# Patient Record
Sex: FEMALE | Race: WHITE | Employment: UNEMPLOYED | URBAN - METROPOLITAN AREA
[De-identification: names, ages, dates, MRNs, and addresses within clinical notes are randomized per-mention and may not be internally consistent; named-entity substitution may affect disease eponyms.]

---

## 2019-09-21 ENCOUNTER — HOSPITAL ENCOUNTER (EMERGENCY)
Facility: CLINIC | Age: 29
Discharge: HOME | End: 2019-09-21
Attending: EMERGENCY MEDICINE | Admitting: EMERGENCY MEDICINE
Payer: COMMERCIAL

## 2019-09-21 VITALS
HEART RATE: 92 BPM | WEIGHT: 173 LBS | DIASTOLIC BLOOD PRESSURE: 77 MMHG | BODY MASS INDEX: 27.15 KG/M2 | OXYGEN SATURATION: 92 % | HEIGHT: 67 IN | TEMPERATURE: 97.5 F | SYSTOLIC BLOOD PRESSURE: 125 MMHG | RESPIRATION RATE: 16 BRPM

## 2019-09-21 DIAGNOSIS — N73.0 PID (ACUTE PELVIC INFLAMMATORY DISEASE): ICD-10-CM

## 2019-09-21 DIAGNOSIS — N39.0 URINARY TRACT INFECTION WITHOUT HEMATURIA, SITE UNSPECIFIED: ICD-10-CM

## 2019-09-21 LAB
ALBUMIN UR-MCNC: 10 MG/DL
APPEARANCE UR: ABNORMAL
BACTERIA #/AREA URNS HPF: ABNORMAL /HPF
BILIRUB UR QL STRIP: NEGATIVE
COLOR UR AUTO: YELLOW
GLUCOSE UR STRIP-MCNC: NEGATIVE MG/DL
HCG UR QL: NEGATIVE
HGB UR QL STRIP: ABNORMAL
KETONES UR STRIP-MCNC: NEGATIVE MG/DL
LEUKOCYTE ESTERASE UR QL STRIP: ABNORMAL
NITRATE UR QL: POSITIVE
PH UR STRIP: 5 PH (ref 5–7)
RBC #/AREA URNS AUTO: 0 /HPF (ref 0–2)
SOURCE: ABNORMAL
SP GR UR STRIP: 1.01 (ref 1–1.03)
SPECIMEN SOURCE: ABNORMAL
SQUAMOUS #/AREA URNS AUTO: 2 /HPF (ref 0–1)
UROBILINOGEN UR STRIP-MCNC: NORMAL MG/DL (ref 0–2)
WBC #/AREA URNS AUTO: >182 /HPF (ref 0–5)
WBC CLUMPS #/AREA URNS HPF: PRESENT /HPF
WET PREP SPEC: ABNORMAL

## 2019-09-21 PROCEDURE — 87491 CHLMYD TRACH DNA AMP PROBE: CPT | Performed by: EMERGENCY MEDICINE

## 2019-09-21 PROCEDURE — 96372 THER/PROPH/DIAG INJ SC/IM: CPT

## 2019-09-21 PROCEDURE — 99284 EMERGENCY DEPT VISIT MOD MDM: CPT | Mod: 25

## 2019-09-21 PROCEDURE — 25000132 ZZH RX MED GY IP 250 OP 250 PS 637: Performed by: EMERGENCY MEDICINE

## 2019-09-21 PROCEDURE — 87210 SMEAR WET MOUNT SALINE/INK: CPT | Performed by: EMERGENCY MEDICINE

## 2019-09-21 PROCEDURE — 25000125 ZZHC RX 250: Performed by: EMERGENCY MEDICINE

## 2019-09-21 PROCEDURE — 81001 URINALYSIS AUTO W/SCOPE: CPT | Performed by: EMERGENCY MEDICINE

## 2019-09-21 PROCEDURE — 81025 URINE PREGNANCY TEST: CPT | Performed by: EMERGENCY MEDICINE

## 2019-09-21 PROCEDURE — 25000128 H RX IP 250 OP 636: Performed by: EMERGENCY MEDICINE

## 2019-09-21 PROCEDURE — 87591 N.GONORRHOEAE DNA AMP PROB: CPT | Performed by: EMERGENCY MEDICINE

## 2019-09-21 RX ORDER — AZITHROMYCIN 250 MG/1
1000 TABLET, FILM COATED ORAL ONCE
Status: DISCONTINUED | OUTPATIENT
Start: 2019-09-21 | End: 2019-09-21

## 2019-09-21 RX ORDER — CEPHALEXIN 500 MG/1
500 CAPSULE ORAL 4 TIMES DAILY
Qty: 40 CAPSULE | Refills: 0 | Status: SHIPPED | OUTPATIENT
Start: 2019-09-21 | End: 2019-10-01

## 2019-09-21 RX ORDER — FLUCONAZOLE 150 MG/1
TABLET ORAL
Qty: 2 TABLET | Refills: 0 | Status: SHIPPED | OUTPATIENT
Start: 2019-09-21 | End: 2019-09-24

## 2019-09-21 RX ORDER — ACETAMINOPHEN 325 MG/1
975 TABLET ORAL ONCE
Status: COMPLETED | OUTPATIENT
Start: 2019-09-21 | End: 2019-09-21

## 2019-09-21 RX ORDER — OXYCODONE HYDROCHLORIDE 5 MG/1
5 TABLET ORAL ONCE
Status: COMPLETED | OUTPATIENT
Start: 2019-09-21 | End: 2019-09-21

## 2019-09-21 RX ORDER — METRONIDAZOLE 500 MG/1
500 TABLET ORAL 2 TIMES DAILY
Qty: 14 TABLET | Refills: 0 | Status: SHIPPED | OUTPATIENT
Start: 2019-09-21 | End: 2019-09-28

## 2019-09-21 RX ORDER — DOXYCYCLINE 100 MG/1
100 CAPSULE ORAL 2 TIMES DAILY
Qty: 28 CAPSULE | Refills: 0 | Status: SHIPPED | OUTPATIENT
Start: 2019-09-21 | End: 2019-10-05

## 2019-09-21 RX ADMIN — OXYCODONE HYDROCHLORIDE 5 MG: 5 TABLET ORAL at 14:11

## 2019-09-21 RX ADMIN — LIDOCAINE HYDROCHLORIDE 250 MG: 10 INJECTION, SOLUTION INFILTRATION; PERINEURAL at 14:47

## 2019-09-21 RX ADMIN — ACETAMINOPHEN 975 MG: 325 TABLET, FILM COATED ORAL at 13:30

## 2019-09-21 ASSESSMENT — ENCOUNTER SYMPTOMS
FEVER: 1
ABDOMINAL PAIN: 0
FREQUENCY: 1
FLANK PAIN: 1
CHILLS: 1
VOMITING: 0
NAUSEA: 0

## 2019-09-21 ASSESSMENT — MIFFLIN-ST. JEOR: SCORE: 1542.35

## 2019-09-21 NOTE — ED AVS SNAPSHOT
Emergency Department  [address] [facility] [address]  Phone:  [phone]  Fax:  [phone]                                    Citlaly Laureano   MRN: [id number]    Department:   Emergency Department   Date of Visit:  9/21/2019           After Visit Summary Signature Page    I have received my discharge instructions, and my questions have been answered. I have discussed any challenges I see with this plan with the nurse or doctor.    ..........................................................................................................................................  Patient/Patient Representative Signature      ..........................................................................................................................................  Patient Representative Print Name and Relationship to Patient    ..................................................               ................................................  Date                                   Time    ..........................................................................................................................................  Reviewed by Signature/Title    ...................................................              ..............................................  Date                                               Time          22EPIC Rev 08/18

## 2019-09-21 NOTE — ED TRIAGE NOTES
Pt reports concern for possible kidney infection. Pt reports lower back pain with urinary symptoms that began yesterday

## 2019-09-21 NOTE — ED PROVIDER NOTES
"  History     Chief Complaint:  Flank Pain      HPI   Citlaly Laureano is a 29 year old female with a history of pyelonephritis who presents with flank pain. The patient reports that two days ago she developed urinary frequency and urgency along with subjective fever and chills. She reports that this morning she developed bilateral flank pain, which she has had in the past with pyelonephritis. This concerned her, prompting her to come to the ED for evaluation. Here, the patient denies nausea, vomiting or abdominal pain. She also denies vaginal discharge or vaginal bleeding. The patient has had an IUD in place since 2013 and is not concerned for pregnancy. The patient also denies concerns for STDs. Ms. Laureano denies a personal history of kidney stones but notes a maternal history of this.     Allergies:  NKDA    Medications:    No Current Medications     Past Medical History:    Pyelonephritis     Past Surgical History:    No Surgical History     Family History:    The patient reports a maternal history of kidney stones. The patient denies any other relevant family history.     Social History:  The patient is single. She reports current everyday tobacco use of 0.5 packs per day. She denies alcohol use and reports meth use.     Review of Systems   Constitutional: Positive for chills and fever.   Gastrointestinal: Negative for abdominal pain, nausea and vomiting.   Genitourinary: Positive for flank pain, frequency and urgency. Negative for vaginal bleeding and vaginal discharge.   All other systems reviewed and are negative.    Physical Exam   First Vitals:  BP: 125/77  Pulse: 92  Temp: 97.5  F (36.4  C)  Resp: 16  Height: 170.2 cm (5' 7\")  Weight: 78.5 kg (173 lb)  SpO2: 100 %    Physical Exam  SKIN:  Warm, dry.    HEMATOLOGIC/IMMUNOLOGIC/LYMPHATIC:  No pallor.  EYES:  Conjunctivae normal.  CARDIOVASCULAR:  Regular rate and rhythm.  RESPIRATORY:  No respiratory distress, breath sounds equal and " normal.  GASTROINTESTINAL:  Soft, nontender abdomen.  GENITOURINARY: Normal external genitals.  No genital rash or inflammation.  Purulent discharge from the office of the cervix.  MUSCULOSKELETAL: No right or left CVA percussion tenderness.  NEUROLOGIC:  Alert, conversant.  PSYCHIATRIC:  Normal mood.    Emergency Department Course     Laboratory:  Wet Prep: Clue cells seen. Moderate PMNs seen, o/w WNL.   HCG, qual: Negative  UA: Yellow, slightly cloudy urine. Trace blood. Protein Albumin 10. Nitrite positive. Large leukocyte esterase. WBC/HPF >182 (high). WBC clumps present. Moderate bacteria. Subcutaneous/HPF 2, o/w WNL.     Gonorrhea PCR: Pending  Chlamydia PCR: Pending    Procedures:  IUD Removal:  Patient's IUD visualized in correct position. Removed using ring forceps. The patient tolerated this well without any complications.     Interventions:  Tylenol 975 mg tablet PO  Oxycodone 5 mg tablet PO   Rocephin 250 mg M Injection    Emergency Department Course:  Nursing notes and vitals reviewed. I performed an exam of the patient as documented above.   12:40 Recheck. Reviewed lab results.    1:00 PM Pelvic exam performed with assistance of female RN. IUD removed, see procedure note above.   1:30 PM The patient was given Tylenol PO, dosage as noted above.    1:53 PM Recheck. Reviewed additional lab results.   2:11 PM The patient was given Oxycodone PO, dosage as noted above.    2:47 PM The patient was given Rocephin PO, dosage as noted above.    Findings and plan explained to the Patient. Patient discharged home with instructions regarding supportive care, medications, and reasons to return. The importance of close follow-up was reviewed. The patient was prescribed Keflex, Doxycycline, Diflucan, and Flagyl.      Impression & Plan      Medical Decision Making:  Citlaly Laureano is a 29 year old female who presents concerned about urinary tract infection. She is also requesting to have her IUD removed. She has  been sexually active and is at risk for STDs. Urinalysis was grossly abnormal, which was consistent with a probable urinary tract infection be it bladder and/or pyelonephritis. In addition, during the extraction of the IUD, there was cervical discharge. This was tested with positivity for bacterial vaginosis. I of course considered chlamydia or gonorrhea, so she was treated empirically for both. She will be treated in general for PID with Doxycycline to continue as an outpatient as well as the Flagyl for bacterial vaginosis and Keflex for UTI. She also requested Diflucan as she has developed yeast infections with previous antibiotic treatments. She is certainly clinically stable and well enough for outpatient treatment. I did not think other testing was warranted at this point. She was advised to return as needed.       Diagnosis:    ICD-10-CM   1. Urinary tract infection without hematuria, site unspecified N39.0   2. PID (acute pelvic inflammatory disease) N73.0       Disposition:  Discharged to home.    Discharge Medications:  New Prescriptions    CEPHALEXIN (KEFLEX) 500 MG CAPSULE    Take 1 capsule (500 mg) by mouth 4 times daily for 10 days    DOXYCYCLINE HYCLATE (VIBRAMYCIN) 100 MG CAPSULE    Take 1 capsule (100 mg) by mouth 2 times daily for 14 days    FLUCONAZOLE (DIFLUCAN) 150 MG TABLET    Take one tablet now, and one tablet in three days    METRONIDAZOLE (FLAGYL) 500 MG TABLET    Take 1 tablet (500 mg) by mouth 2 times daily for 7 days       Jordin MARTINEZ, am serving as a scribe at 11:46 AM on 9/21/2019 to document services personally performed by Kevin Rubin MD, based on my observations and the provider's statements to me.         Kevin Rubin MD  09/21/19 2008

## 2019-09-22 LAB
C TRACH DNA SPEC QL NAA+PROBE: NEGATIVE
N GONORRHOEA DNA SPEC QL NAA+PROBE: NEGATIVE
SPECIMEN SOURCE: NORMAL
SPECIMEN SOURCE: NORMAL

## 2019-09-23 ENCOUNTER — HOSPITAL ENCOUNTER (EMERGENCY)
Facility: CLINIC | Age: 29
Discharge: HOME | End: 2019-09-24
Attending: EMERGENCY MEDICINE | Admitting: EMERGENCY MEDICINE
Payer: COMMERCIAL

## 2019-09-23 DIAGNOSIS — T40.601A NARCOTIC OVERDOSE, ACCIDENTAL OR UNINTENTIONAL, INITIAL ENCOUNTER (H): ICD-10-CM

## 2019-09-23 LAB
ANION GAP SERPL CALCULATED.3IONS-SCNC: 5 MMOL/L (ref 3–14)
BASOPHILS # BLD AUTO: 0 10E9/L (ref 0–0.2)
BASOPHILS NFR BLD AUTO: 0.3 %
BUN SERPL-MCNC: 12 MG/DL (ref 7–30)
CALCIUM SERPL-MCNC: 8.5 MG/DL (ref 8.5–10.1)
CHLORIDE SERPL-SCNC: 106 MMOL/L (ref 94–109)
CO2 SERPL-SCNC: 28 MMOL/L (ref 20–32)
CREAT SERPL-MCNC: 0.66 MG/DL (ref 0.52–1.04)
DIFFERENTIAL METHOD BLD: NORMAL
EOSINOPHIL # BLD AUTO: 0 10E9/L (ref 0–0.7)
EOSINOPHIL NFR BLD AUTO: 0.5 %
ERYTHROCYTE [DISTWIDTH] IN BLOOD BY AUTOMATED COUNT: 12.3 % (ref 10–15)
GFR SERPL CREATININE-BSD FRML MDRD: >90 ML/MIN/{1.73_M2}
GLUCOSE BLDC GLUCOMTR-MCNC: 89 MG/DL (ref 70–99)
GLUCOSE SERPL-MCNC: 88 MG/DL (ref 70–99)
HCG SERPL QL: NEGATIVE
HCT VFR BLD AUTO: 40.5 % (ref 35–47)
HGB BLD-MCNC: 13.5 G/DL (ref 11.7–15.7)
IMM GRANULOCYTES # BLD: 0 10E9/L (ref 0–0.4)
IMM GRANULOCYTES NFR BLD: 0.3 %
LYMPHOCYTES # BLD AUTO: 1.6 10E9/L (ref 0.8–5.3)
LYMPHOCYTES NFR BLD AUTO: 23.9 %
MCH RBC QN AUTO: 30.5 PG (ref 26.5–33)
MCHC RBC AUTO-ENTMCNC: 33.3 G/DL (ref 31.5–36.5)
MCV RBC AUTO: 91 FL (ref 78–100)
MONOCYTES # BLD AUTO: 0.5 10E9/L (ref 0–1.3)
MONOCYTES NFR BLD AUTO: 7.1 %
NEUTROPHILS # BLD AUTO: 4.5 10E9/L (ref 1.6–8.3)
NEUTROPHILS NFR BLD AUTO: 67.9 %
NRBC # BLD AUTO: 0 10*3/UL
NRBC BLD AUTO-RTO: 0 /100
PLATELET # BLD AUTO: 176 10E9/L (ref 150–450)
POTASSIUM SERPL-SCNC: 4.1 MMOL/L (ref 3.4–5.3)
RBC # BLD AUTO: 4.43 10E12/L (ref 3.8–5.2)
SODIUM SERPL-SCNC: 139 MMOL/L (ref 133–144)
WBC # BLD AUTO: 6.6 10E9/L (ref 4–11)

## 2019-09-23 PROCEDURE — 93005 ELECTROCARDIOGRAM TRACING: CPT

## 2019-09-23 PROCEDURE — 85025 COMPLETE CBC W/AUTO DIFF WBC: CPT | Performed by: EMERGENCY MEDICINE

## 2019-09-23 PROCEDURE — 00000146 ZZHCL STATISTIC GLUCOSE BY METER IP

## 2019-09-23 PROCEDURE — 99284 EMERGENCY DEPT VISIT MOD MDM: CPT | Mod: 25

## 2019-09-23 PROCEDURE — 25800030 ZZH RX IP 258 OP 636: Performed by: EMERGENCY MEDICINE

## 2019-09-23 PROCEDURE — 80048 BASIC METABOLIC PNL TOTAL CA: CPT | Performed by: EMERGENCY MEDICINE

## 2019-09-23 PROCEDURE — 96374 THER/PROPH/DIAG INJ IV PUSH: CPT

## 2019-09-23 PROCEDURE — 25000128 H RX IP 250 OP 636

## 2019-09-23 PROCEDURE — 96361 HYDRATE IV INFUSION ADD-ON: CPT

## 2019-09-23 PROCEDURE — 84703 CHORIONIC GONADOTROPIN ASSAY: CPT | Performed by: EMERGENCY MEDICINE

## 2019-09-23 PROCEDURE — 25000128 H RX IP 250 OP 636: Performed by: EMERGENCY MEDICINE

## 2019-09-23 RX ORDER — ONDANSETRON 2 MG/ML
4 INJECTION INTRAMUSCULAR; INTRAVENOUS
Status: COMPLETED | OUTPATIENT
Start: 2019-09-23 | End: 2019-09-23

## 2019-09-23 RX ORDER — ONDANSETRON 2 MG/ML
4 INJECTION INTRAMUSCULAR; INTRAVENOUS ONCE
Status: COMPLETED | OUTPATIENT
Start: 2019-09-23 | End: 2019-09-23

## 2019-09-23 RX ORDER — ONDANSETRON 2 MG/ML
INJECTION INTRAMUSCULAR; INTRAVENOUS
Status: COMPLETED
Start: 2019-09-23 | End: 2019-09-23

## 2019-09-23 RX ORDER — SODIUM CHLORIDE, SODIUM LACTATE, POTASSIUM CHLORIDE, CALCIUM CHLORIDE 600; 310; 30; 20 MG/100ML; MG/100ML; MG/100ML; MG/100ML
1000 INJECTION, SOLUTION INTRAVENOUS CONTINUOUS
Status: DISCONTINUED | OUTPATIENT
Start: 2019-09-23 | End: 2019-09-24 | Stop reason: HOSPADM

## 2019-09-23 RX ADMIN — ONDANSETRON 4 MG: 2 INJECTION INTRAMUSCULAR; INTRAVENOUS at 23:02

## 2019-09-23 RX ADMIN — SODIUM CHLORIDE, POTASSIUM CHLORIDE, SODIUM LACTATE AND CALCIUM CHLORIDE 1000 ML: 600; 310; 30; 20 INJECTION, SOLUTION INTRAVENOUS at 22:35

## 2019-09-23 RX ADMIN — SODIUM CHLORIDE, POTASSIUM CHLORIDE, SODIUM LACTATE AND CALCIUM CHLORIDE 1000 ML: 600; 310; 30; 20 INJECTION, SOLUTION INTRAVENOUS at 23:27

## 2019-09-23 RX ADMIN — ONDANSETRON 4 MG: 2 INJECTION INTRAMUSCULAR; INTRAVENOUS at 22:36

## 2019-09-23 ASSESSMENT — ENCOUNTER SYMPTOMS
NAUSEA: 1
ABDOMINAL PAIN: 0

## 2019-09-23 ASSESSMENT — MIFFLIN-ST. JEOR: SCORE: 1579.63

## 2019-09-23 NOTE — ED AVS SNAPSHOT
Emergency Department  [address] [facility] [address]  Phone:  [phone]  Fax:  [phone]                                    Citlaly Laureano   MRN: [id number]    Department:   Emergency Department   Date of Visit:  9/23/2019           After Visit Summary Signature Page    I have received my discharge instructions, and my questions have been answered. I have discussed any challenges I see with this plan with the nurse or doctor.    ..........................................................................................................................................  Patient/Patient Representative Signature      ..........................................................................................................................................  Patient Representative Print Name and Relationship to Patient    ..................................................               ................................................  Date                                   Time    ..........................................................................................................................................  Reviewed by Signature/Title    ...................................................              ..............................................  Date                                               Time          22EPIC Rev 08/18

## 2019-09-24 VITALS
TEMPERATURE: 98.1 F | HEIGHT: 67 IN | SYSTOLIC BLOOD PRESSURE: 130 MMHG | OXYGEN SATURATION: 98 % | HEART RATE: 80 BPM | RESPIRATION RATE: 13 BRPM | DIASTOLIC BLOOD PRESSURE: 89 MMHG | WEIGHT: 181.22 LBS | BODY MASS INDEX: 28.44 KG/M2

## 2019-09-24 LAB — INTERPRETATION ECG - MUSE: NORMAL

## 2019-09-24 NOTE — ED NOTES
Patient has been unable to get a hold of friend or family member. Her boyfriends sister Alina did call hospital and stated that patient is not welcome at her home.

## 2019-09-24 NOTE — ED NOTES
Bed: ST02  Expected date:   Expected time:   Means of arrival:   Comments:  533 (yellow)  28F  Opiate Ingestion/ Narcan 8mg given  2212

## 2019-09-24 NOTE — ED TRIAGE NOTES
Pt did what she thought was a line of cocaine tonight, but states it may have been heroine, boyfriend found her unresponsive and not breathing, pt boyfriend started CPR, PD arrived, took over CPR and gave 8mg intranasal narcan. Total time for cpr approx 18min. EMS arrival pt awake, [id number]

## 2019-09-24 NOTE — ED PROVIDER NOTES
History     Chief Complaint:  Drug Overdose    HPI   Citlaly Laureano is a 29 year old female who presents to the emergency department today for evaluation of a drug overdose. EMS report that the patient snorted what she believes was a line of cocaine tonight and subsequently was found unresponsive by her significant other. This individual started CPR which was continued by PD for a total of 18 minutes. PD also provided 8 mg Narcan intranasally before being transported to the ED. Here the patient remembers doing her makeup tonight (was dark, unsure of exact time) and has a lapse in memory until waking to EMS. She reports that she has been sober from smoking methamphetamine for the last week (has used IV in past). Here the patient endorses nausea but denies any abdominal pain, thoughts of self harm, use of narcotics, or chance of pregnancy. Of note, the patient was evaluated in the ED on 9/21/19 for flank pain and was diagnosed with urinary tract infection and acute pelvic inflammatory disease. She is from [place], [place] and is currently staying with her significant other, Malik.     Allergies:  Venlafaxine analogues  Atomoxetine  Lisdexamfetamine    Medications:    Diflucan  Keflex  Vibramycin  Flagyl    Past Medical History:    Chronic hepatitis C virus infection  Asthma  Pyelonephritis  Urinary tract infection  Acute pelvic inflammatory disease     Past Surgical History:    Surgical history reviewed. No pertinent surgical history.    Family History:    Mother: kidney stones    Social History:  Patient from [place], [place]  Staying with significant other, Malik.   Smoking Status: Current Every Day Smoker   Packs per day: 0.5   Types: Cigarettes  Smokeless Tobacco: Never Used  Alcohol Use: Negative  Drug Use: Positive  Marital Status:  Single      Review of Systems   Gastrointestinal: Positive for nausea. Negative for abdominal pain.   Neurological:        Unresponsive    Psychiatric/Behavioral: Negative for  "self-injury and suicidal ideas.   All other systems reviewed and are negative.      Physical Exam     Patient Vitals for the past 24 hrs:   BP Temp Pulse Heart Rate Resp SpO2 Height Weight   09/24/19 0230 (!) 132/93 -- 83 87 13 98 % -- --   09/24/19 0200 (!) 128/94 -- 84 85 17 98 % -- --   09/24/19 0015 (!) 130/93 -- 75 82 9 99 % -- --   09/23/19 2330 (!) 129/98 -- 78 83 13 100 % -- --   09/23/19 2315 (!) 131/95 -- 80 79 15 100 % -- --   09/23/19 2300 (!) 133/96 -- 80 77 9 99 % -- --   09/23/19 2245 111/85 -- 74 73 -- 100 % -- --   09/23/19 2230 119/81 -- 70 66 -- 100 % -- --   09/23/19 2222 (!) 138/100 98.1  F (36.7  C) 86 -- 20 99 % 1.702 m (5' 7\") 82.2 kg (181 lb 3.5 oz)     Physical Exam  Gen: Patient actively vomiting.    Eye:   Pupils are pinpoint.     Sclera non-injected.    ENT:   Moist mucus membranes.     Normal tongue.    Oropharynx without lesions.    Cardiac:     Normal rate and regular rhythm.    No murmurs, gallops, or rubs.    Pulmonary:     Clear to auscultation bilaterally.    No wheezes, rales, or rhonchi.    Abdomen:     Normal active bowel sounds.     Abdomen is soft and non-distended, without focal tenderness.    Musculoskeletal:     Normal movement of all extremities without evidence for deficit.    Extremities:    No edema.    Skin:   Diaphoretic.    Neurologic:    Non-focal exam without asymmetric weakness or numbness.    Normal tone    Psychiatric:     Cooperative.  No SI/HI.    Emergency Department Course     ECG:  ECG taken at 2224, ECG read at 2237  Normal sinus rhythm with sinus arrhythmia  Cannot rule out anterior infarct, age undetermined   Abnormal ECG  Rate 83 bpm. NJ interval 176 ms. QRS duration 98 ms. QT/QTc 360/446 ms. P-R-T axes 56 54 54.    Laboratory:  Laboratory findings were communicated with the patient who voiced understanding of the findings.    CBC: WBC 6.6, HGB 13.5,   BMP: WNL (Creatinine 0.66)  HCG Qualitative Pregnancy: negative  Glucose by Meter: " 89    Interventions:  2235 NS 1000 ml IV  2236 Zofran 4 mg IV  2302 Zofran 4 mg IV  2327 NS 1000 ml IV    Emergency Department Course:    2219 Glucose by meter obtained.     2223 Nursing notes and vitals reviewed. I performed an exam of the patient as documented above.     2224 EKG obtained as noted above.    2234 IV was inserted and blood was drawn for laboratory testing, results above.    0315 Patient rechecked and updated. She denies complaints.  Will call boyfriend for ride.    Findings and plan explained to the Patient. Patient discharged home with instructions regarding supportive care, medications, and reasons to return. The importance of close follow-up was reviewed. The patient was prescribed Narcan.    Impression & Plan      Medical Decision Making:  Citlaly Laureano is a 29 year old female with a history of methamphetamine abuse who presents to the emergency department today for evaluation of narcotic overdose. Upon arrival to the ED, the patient was actively vomiting but alert and awake. She has sobered appropriately in the ED and has not required any additional narcan following at least four hours of observation. She understands her behaviors tonight were high risk.  No suicidal ideation or evidence for trauma. She was provided a narcan script for overdose. Otherwise, she will return to the ED if she does not feel safe or for any other concerns.     Diagnosis:    ICD-10-CM    1. Narcotic overdose, accidental or unintentional, initial encounter (H) T40.601A      Disposition:   The patient is discharged to home.    Discharge Medications:     START taking      Dose / Directions   naloxone 1 mg/mL for intranasal kit (2 syringes with 2 mucosal atomizer device)  Commonly known as:  NARCAN      In opioid overdose put cone in nostril and push 1/2 of contents into each nostril.  Repeat every 3 min if no response until help arrives.  Quantity:  2 mL  Refills:  1           Where to get your medicines      Some  of these will need a paper prescription and others can be bought over the counter. Ask your nurse if you have questions.    Bring a paper prescription for each of these medications    naloxone 1 mg/mL for intranasal kit (2 syringes with 2 mucosal atomizer device)       Scribe Disclosure:  I, Mikayla Hortensia, am serving as a scribe at 10:28 PM on 9/23/2019 to document services personally performed by Candy Everett MD based on my observations and the provider's statements to me.     EMERGENCY DEPARTMENT       Candy Everett MD  09/25/19 1583

## 2019-09-24 NOTE — ED NOTES
Boyfriend Malik phone # [phone]  Betsy sister Alina phone # [phone]  Patient and boyfriend Malik  were staying at [facility] and Alina states that patient is not welcome to return to her home.

## 2019-09-24 NOTE — ED NOTES
Patient awake and pacing in room. She is asking for something to eat and says she is ready to leave. Patient was given a courtesy meal and two bus tokens. She is using the phone in the room to try again to reach a family member or friend.

## 2019-09-24 NOTE — ED NOTES
Patient is up to bathroom. She is alert and oriented x 4. Her gait is steady. She is trying to contact a friend or a family member for a ride home.